# Patient Record
Sex: MALE | Race: OTHER | HISPANIC OR LATINO | Employment: UNEMPLOYED | URBAN - METROPOLITAN AREA
[De-identification: names, ages, dates, MRNs, and addresses within clinical notes are randomized per-mention and may not be internally consistent; named-entity substitution may affect disease eponyms.]

---

## 2021-07-16 ENCOUNTER — TELEPHONE (OUTPATIENT)
Dept: UROLOGY | Facility: AMBULATORY SURGERY CENTER | Age: 51
End: 2021-07-16

## 2021-07-16 NOTE — TELEPHONE ENCOUNTER
Reason for appointment/Complaint/Diagnosis : 4 mm kidney stone went to [facility]  emergency room     Insurance: [phone] [address]  History of Cancer? [place]           If yes, what kind? n/a  Previous urologist? no                  Records requested/where? Care Everywhere  Outside testing/where? [facility]    Location Preference for office visit?  [place]

## 2021-07-19 NOTE — TELEPHONE ENCOUNTER
Patient called in regards to medical records  Advised records are in care everywhere no need for records at this time as we can view them

## 2021-07-20 ENCOUNTER — OFFICE VISIT (OUTPATIENT)
Dept: UROLOGY | Facility: CLINIC | Age: 51
End: 2021-07-20
Payer: COMMERCIAL

## 2021-07-20 VITALS — HEART RATE: 80 BPM | SYSTOLIC BLOOD PRESSURE: 118 MMHG | WEIGHT: 208 LBS | DIASTOLIC BLOOD PRESSURE: 72 MMHG

## 2021-07-20 DIAGNOSIS — N20.1 URETERAL CALCULUS, LEFT: Primary | ICD-10-CM

## 2021-07-20 PROCEDURE — 99204 OFFICE O/P NEW MOD 45 MIN: CPT | Performed by: PHYSICIAN ASSISTANT

## 2021-07-20 RX ORDER — IBUPROFEN 600 MG/1
600 TABLET ORAL EVERY 8 HOURS PRN
Qty: 15 TABLET | Refills: 0 | Status: SHIPPED | OUTPATIENT
Start: 2021-07-20 | End: 2021-07-25

## 2021-07-20 RX ORDER — OMEGA-3/DHA/EPA/FISH OIL 300-1000MG
400 CAPSULE ORAL DAILY
COMMUNITY

## 2021-07-20 RX ORDER — OXYCODONE HYDROCHLORIDE 5 MG/1
TABLET ORAL
COMMUNITY
Start: 2021-07-14 | End: 2021-07-24

## 2021-07-20 RX ORDER — ASCORBIC ACID 500 MG
500 TABLET ORAL DAILY
COMMUNITY

## 2021-07-20 RX ORDER — ALBUTEROL SULFATE 90 UG/1
2 AEROSOL, METERED RESPIRATORY (INHALATION) EVERY 4 HOURS PRN
COMMUNITY
Start: 2021-03-08

## 2021-07-20 RX ORDER — OXYCODONE HYDROCHLORIDE 5 MG/1
5 TABLET ORAL EVERY 6 HOURS PRN
Qty: 5 TABLET | Refills: 0 | Status: SHIPPED | OUTPATIENT
Start: 2021-07-20

## 2021-07-20 RX ORDER — TAMSULOSIN HYDROCHLORIDE 0.4 MG/1
0.4 CAPSULE ORAL
Qty: 30 CAPSULE | Refills: 0 | Status: SHIPPED | OUTPATIENT
Start: 2021-07-20

## 2021-07-20 NOTE — PATIENT INSTRUCTIONS
Dietary Management of Kidney Stone Disease    The dietary recommendations for most people who make kidney stones (especially the most common calcium oxalate stones) are uncomplicated and are not too tedious or bland  Most importantly, the following recommendations also promote better health for a variety of reasons  FLUIDS:  The single most important change for the majority patients is the need to greatly increase fluid intake  You should at least produce two liters (about two quarts) of urine each day  Depending on the heat outdoors and your level of physical activity, this usually means consuming ten, 10 ounce glasses (100 ounces) of fluid per day  Water is always a good choice, but other drinks including tea, coffee, soda, and juice are also allowed as long as no one beverage becomes the sole source of fluid  CALCIUM:  There is excellent evidence that calcium should not be avoided, but instead moderated  A range of 600 to 1,100 mg of calcium per day, especially consumed at meals is probably a reasonable target  (i e  2-3 dairy servings per day) This might include small servings of yogurt, milk or ice cream   This amount helps avoid over-absorption of oxalate from the digestive tract and also allows for healthy bone maintenance  SODIUM (SALT): Too much salt in your diet (both from the shaker and in the prepared foods that we buy) is bad for your blood pressure, bad for your heart, and also increases the amount of calcium in your urine  A reasonable sodium restriction to 2,000-2,500mg/day (about the amount in one teaspoon) is an excellent target  You should get into the habit of reading the Nutrients labels on all the foods that you eat and watch out for the foods that have a high sodium content (snack foods, smoked or processed foods, caned foods)  Fresh and frozen foods usually have the least amount of sodium      PROTEIN:  High protein diets from animal meat (beef, chicken, pork, fish) also increases the rate of kidney stone formation and is equally unhealthy for your heart  All patients should moderate their meat intake to 3-7 ounces per day, and particularly stay away from red meat protein  OXALATE:  Most stone-formers should avoid heavy intake of oxalate-rich foods  These include green roughage (spinach, mustard, kale), strawberries, chocolate, tea, iced tea, and nuts  In addition, heavy, excess doses of Vitamin C can also produce surges in urinary oxalate levels and should be avoided  BARE-BONES RECOMMENDATIONS:  1  Fluids, fluids, fluids  2  Low salt diet (your primary care doctor will love you)  3  Moderate calcium (dairy products), especially with meals  4  Moderate red meat intake

## 2021-07-20 NOTE — PROGRESS NOTES
7/20/2021      Chief Complaint   Patient presents with    New Patient Visit         Assessment and Plan    44 y o  male    1  Left distal ureteral stone 4mm with mild hydro and mild ABIGAIL  - flomax, motrin, oxycodone refilled  - f/u KUB/US and BMP in 3 weeks    Discussed with patient the treatment options for acute stone disease  We have discussed continued trial of passage, with hydration, Flomax and time with follow-up imaging in 2-4 weeks if no spontaneous passage  We have alternatively discussed ureteroscopy, laser lithotripsy and/or temporary ureteral stent placement  We discussed the risks benefits and time frame for each option  He wishes to continue trial of passage without surgical intervention  His symptoms are minimal at this time  Starting today patient will strain all urine, notify office if spontaneous passage, strainers/specimen cup provided  Medications reviewed/updated/refilled as appropriate  History of Present Illness  Arelis Cuadra is a 44 y o  male here for evaluation of  ER follow-up left 4mm distal ureteral stone  This is his first ever stone episode  His left flank/groin pain is much better than it was at initial presentation  He is using oxycodone sparingly  He has had no fevers, dysuria, hematuria or vomiting  Review of Systems   Constitutional: Negative for activity change, appetite change, chills, fever and unexpected weight change  HENT: Negative  Respiratory: Negative  Negative for shortness of breath  Cardiovascular: Negative  Negative for chest pain  Gastrointestinal: Negative for abdominal pain, diarrhea, nausea and vomiting  Endocrine: Negative  Genitourinary: Positive for flank pain  Negative for decreased urine volume, difficulty urinating, dysuria, frequency, hematuria, testicular pain and urgency  Musculoskeletal: Negative for back pain and gait problem  Skin: Negative  Allergic/Immunologic: Negative  Neurological: Negative  Hematological: Negative for adenopathy  Does not bruise/bleed easily  Vitals  Vitals:    07/20/21 1422   BP: 118/72   BP Location: Left arm   Patient Position: Sitting   Cuff Size: Adult   Pulse: 80   Weight: 94 3 kg (208 lb)       Physical Exam  Vitals and nursing note reviewed  Constitutional:       General: He is not in acute distress  Appearance: Normal appearance  He is well-developed  He is not diaphoretic  HENT:      Head: Normocephalic and atraumatic  Pulmonary:      Effort: Pulmonary effort is normal       Comments: No cough or audible wheeze  Abdominal:      General: There is no distension  Tenderness: There is no abdominal tenderness  There is no right CVA tenderness or left CVA tenderness  Musculoskeletal:      Right lower leg: No edema  Left lower leg: No edema  Skin:     General: Skin is warm and dry  Neurological:      Mental Status: He is alert and oriented to person, place, and time  Gait: Gait normal    Psychiatric:         Speech: Speech normal          Behavior: Behavior normal            Past History  History reviewed  No pertinent past medical history    Social History     Socioeconomic History    Marital status: /Civil Union     Spouse name: None    Number of children: None    Years of education: None    Highest education level: None   Occupational History    None   Tobacco Use    Smoking status: Former Smoker    Smokeless tobacco: Never Used   Vaping Use    Vaping Use: Never used   Substance and Sexual Activity    Alcohol use: None     Comment: occasional    Drug use: Never    Sexual activity: None   Other Topics Concern    None   Social History Narrative    None     Social Determinants of Health     Financial Resource Strain:     Difficulty of Paying Living Expenses:    Food Insecurity:     Worried About Running Out of Food in the Last Year:     Kiran of Food in the Last Year:    Transportation Needs:     Lack of Transportation (Medical):  Lack of Transportation (Non-Medical):    Physical Activity:     Days of Exercise per Week:     Minutes of Exercise per Session:    Stress:     Feeling of Stress :    Social Connections:     Frequency of Communication with Friends and Family:     Frequency of Social Gatherings with Friends and Family:     Attends [employer] Services:     Active Member of Clubs or Organizations:     Attends Club or Organization Meetings:     Marital Status:    Intimate Partner Violence:     Fear of Current or Ex-Partner:     Emotionally Abused:     Physically Abused:     Sexually Abused:      Social History     Tobacco Use   Smoking Status Former Smoker   Smokeless Tobacco Never Used     History reviewed  No pertinent family history  The following portions of the patient's history were reviewed and updated as appropriate: allergies, current medications, past medical history, past social history, past surgical history and problem list     Results  No results found for this or any previous visit (from the past 1 hour(s))  ]  No results found for: PSA  No results found for: GLUCOSE, CALCIUM, NA, K, CO2, CL, BUN, CREATININE  No results found for: WBC, HGB, HCT, MCV, PLT